# Patient Record
Sex: FEMALE | Race: WHITE | NOT HISPANIC OR LATINO | URBAN - NONMETROPOLITAN AREA
[De-identification: names, ages, dates, MRNs, and addresses within clinical notes are randomized per-mention and may not be internally consistent; named-entity substitution may affect disease eponyms.]

---
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
## 2023-06-09 NOTE — PROCEDURE: ADDITIONAL NOTES
Detail Level: Zone
Additional Notes: patient to complete prednisone and cephalexin as prescribed. Pt advised that it can take months for the post-inflammatory hyperpigmentation to fully resolve. Patient advised to apply Vaseline daily to the area and avoid picking at scabs present to prevent scarring.
Home